# Patient Record
Sex: MALE | Race: WHITE
[De-identification: names, ages, dates, MRNs, and addresses within clinical notes are randomized per-mention and may not be internally consistent; named-entity substitution may affect disease eponyms.]

---

## 2019-08-25 ENCOUNTER — HOSPITAL ENCOUNTER (EMERGENCY)
Dept: HOSPITAL 96 - M.ERS | Age: 49
Discharge: HOME | End: 2019-08-25
Payer: COMMERCIAL

## 2019-08-25 VITALS — SYSTOLIC BLOOD PRESSURE: 127 MMHG | DIASTOLIC BLOOD PRESSURE: 91 MMHG

## 2019-08-25 VITALS — BODY MASS INDEX: 25.18 KG/M2 | HEIGHT: 69 IN | WEIGHT: 170 LBS

## 2019-08-25 DIAGNOSIS — F17.210: ICD-10-CM

## 2019-08-25 DIAGNOSIS — Z87.442: ICD-10-CM

## 2019-08-25 DIAGNOSIS — S60.551A: Primary | ICD-10-CM
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
## 2019-08-25 DIAGNOSIS — Z90.49: ICD-10-CM

## 2019-08-25 DIAGNOSIS — E78.00: ICD-10-CM
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
## 2019-08-25 DIAGNOSIS — Z85.51: ICD-10-CM

## 2020-01-22 ENCOUNTER — HOSPITAL ENCOUNTER (EMERGENCY)
Dept: HOSPITAL 96 - M.ERS | Age: 50
Discharge: HOME | End: 2020-01-22
Payer: COMMERCIAL

## 2020-01-22 VITALS — DIASTOLIC BLOOD PRESSURE: 80 MMHG | SYSTOLIC BLOOD PRESSURE: 118 MMHG

## 2020-01-22 VITALS — WEIGHT: 175 LBS | BODY MASS INDEX: 25.92 KG/M2 | HEIGHT: 69 IN

## 2020-01-22 DIAGNOSIS — R07.89: Primary | ICD-10-CM

## 2020-01-22 DIAGNOSIS — Z87.442: ICD-10-CM

## 2020-01-22 DIAGNOSIS — Z85.51: ICD-10-CM

## 2020-01-22 DIAGNOSIS — F17.210: ICD-10-CM

## 2020-01-22 DIAGNOSIS — E78.00: ICD-10-CM

## 2020-01-22 DIAGNOSIS — Z90.49: ICD-10-CM

## 2020-01-22 LAB
ABSOLUTE BASOPHILS: 0 THOU/UL (ref 0–0.2)
ABSOLUTE EOSINOPHILS: 0.1 THOU/UL (ref 0–0.7)
ABSOLUTE MONOCYTES: 0.9 THOU/UL (ref 0–1.2)
ALBUMIN SERPL-MCNC: 3.7 G/DL (ref 3.4–5)
ALP SERPL-CCNC: 91 U/L (ref 46–116)
ALT SERPL-CCNC: 47 U/L (ref 30–65)
ANION GAP SERPL CALC-SCNC: 6 MMOL/L (ref 7–16)
AST SERPL-CCNC: 25 U/L (ref 15–37)
BASOPHILS NFR BLD AUTO: 0.4 %
BILIRUB SERPL-MCNC: 0.2 MG/DL
BUN SERPL-MCNC: 9 MG/DL (ref 7–18)
CALCIUM SERPL-MCNC: 8.2 MG/DL (ref 8.5–10.1)
CHLORIDE SERPL-SCNC: 103 MMOL/L (ref 98–107)
CO2 SERPL-SCNC: 28 MMOL/L (ref 21–32)
CREAT SERPL-MCNC: 0.9 MG/DL (ref 0.6–1.3)
EOSINOPHIL NFR BLD: 1.4 %
GLUCOSE SERPL-MCNC: 97 MG/DL (ref 70–99)
GRANULOCYTES NFR BLD MANUAL: 61.8 %
HCT VFR BLD CALC: 42.6 % (ref 42–52)
HGB BLD-MCNC: 14.7 GM/DL (ref 14–18)
INR PPP: 1
LIPASE: 55 U/L (ref 73–393)
LYMPHOCYTES # BLD: 1.9 THOU/UL (ref 0.8–5.3)
LYMPHOCYTES NFR BLD AUTO: 25 %
MCH RBC QN AUTO: 31.6 PG (ref 26–34)
MCHC RBC AUTO-ENTMCNC: 34.6 G/DL (ref 28–37)
MCV RBC: 91.4 FL (ref 80–100)
MONOCYTES NFR BLD: 11.4 %
MPV: 8 FL. (ref 7.2–11.1)
NEUTROPHILS # BLD: 4.6 THOU/UL (ref 1.6–8.1)
NT-PRO BRAIN NAT PEPTIDE: 24 PG/ML (ref ?–300)
NUCLEATED RBCS: 0 /100WBC
PLATELET COUNT*: 237 THOU/UL (ref 150–400)
POTASSIUM SERPL-SCNC: 4.2 MMOL/L (ref 3.5–5.1)
PROT SERPL-MCNC: 7 G/DL (ref 6.4–8.2)
PROTHROMBIN TIME: 9.8 SECONDS (ref 9.2–11.5)
RBC # BLD AUTO: 4.66 MIL/UL (ref 4.5–6)
RDW-CV: 14.3 % (ref 10.5–14.5)
SODIUM SERPL-SCNC: 137 MMOL/L (ref 136–145)
WBC # BLD AUTO: 7.5 THOU/UL (ref 4–11)

## 2020-01-22 NOTE — EKG
[place] [address]
Phone:  [phone]                     ELECTROCARDIOGRAM REPORT      
_______________________________________________________________________________
 
Name:       LINDA REY                  Room:                      [facility]#:  [id number]      Account #:      [id number]  
Admission:  20     Attend Phys:                         
Discharge:  20     Date of Birth:  [birth year]  
         Report #: [id number]
    [id number]
_______________________________________________________________________________
THIS REPORT FOR:  //name//                      
 
                         [facility] ED
                                       
Test Date:    2020               Test Time:    10:26:28
Pat Name:     LINDA REY              Department:   
Patient ID:   [id number]            Room:          
Gender:       M                        Technician:   
:          1970               Requested By: Yash Aggarwal
Order Number: [id number] MD:   Luis Alberto Bernstein
                                 Measurements
Intervals                              Axis          
Rate:         86                       P:            69
AL:           147                      QRS:          14
QRSD:         91                       T:            48
QT:           354                                    
QTc:          424                                    
                           Interpretive Statements
Sinus rhythm
 
Compared to ECG 2015 09:17:50
no change
 
Electronically Signed On 2020 15:15:18 CST by Luis Alberto Bernstein
[email or web address]
 
 
 
 
 
 
 
 
 
 
 
 
 
 
 
 
 
 
  <ELECTRONICALLY SIGNED>
                                           By: Luis Alberto Bernstein MD, [facility]      
  20     1515
D: 20 1026   _____________________________________
T: 20 1026   Luis Alberto Bernstein MD, FACC        /EPI

## 2020-08-23 ENCOUNTER — HOSPITAL ENCOUNTER (EMERGENCY)
Dept: HOSPITAL 96 - M.ERS | Age: 50
Discharge: HOME | End: 2020-08-23
Payer: COMMERCIAL

## 2020-08-23 VITALS — DIASTOLIC BLOOD PRESSURE: 82 MMHG | SYSTOLIC BLOOD PRESSURE: 121 MMHG

## 2020-08-23 VITALS — BODY MASS INDEX: 24.88 KG/M2 | HEIGHT: 69 IN | WEIGHT: 167.99 LBS

## 2020-08-23 DIAGNOSIS — E78.00: ICD-10-CM

## 2020-08-23 DIAGNOSIS — M25.461: ICD-10-CM

## 2020-08-23 DIAGNOSIS — Z90.49: ICD-10-CM

## 2020-08-23 DIAGNOSIS — Z87.442: ICD-10-CM

## 2020-08-23 DIAGNOSIS — Z85.51: ICD-10-CM

## 2020-08-23 DIAGNOSIS — F17.210: ICD-10-CM

## 2020-08-23 DIAGNOSIS — M70.51: Primary | ICD-10-CM
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
## 2021-01-14 ENCOUNTER — HOSPITAL ENCOUNTER (EMERGENCY)
Dept: HOSPITAL 96 - M.ERS | Age: 51
Discharge: HOME | End: 2021-01-14
Payer: COMMERCIAL

## 2021-01-14 VITALS — WEIGHT: 172 LBS | BODY MASS INDEX: 24.62 KG/M2 | HEIGHT: 70 IN

## 2021-01-14 VITALS — DIASTOLIC BLOOD PRESSURE: 97 MMHG | SYSTOLIC BLOOD PRESSURE: 139 MMHG

## 2021-01-14 DIAGNOSIS — E78.5: ICD-10-CM

## 2021-01-14 DIAGNOSIS — F17.210: ICD-10-CM

## 2021-01-14 DIAGNOSIS — K08.89: Primary | ICD-10-CM

## 2021-04-18 ENCOUNTER — HOSPITAL ENCOUNTER (EMERGENCY)
Dept: HOSPITAL 96 - M.ERS | Age: 51
Discharge: HOME | End: 2021-04-18
Payer: COMMERCIAL

## 2021-04-18 VITALS — WEIGHT: 181.4 LBS | BODY MASS INDEX: 26.87 KG/M2 | HEIGHT: 69 IN

## 2021-04-18 VITALS — DIASTOLIC BLOOD PRESSURE: 80 MMHG | SYSTOLIC BLOOD PRESSURE: 121 MMHG

## 2021-04-18 DIAGNOSIS — E78.00: ICD-10-CM

## 2021-04-18 DIAGNOSIS — F17.210: ICD-10-CM

## 2021-04-18 DIAGNOSIS — Z87.442: ICD-10-CM

## 2021-04-18 DIAGNOSIS — R07.89: Primary | ICD-10-CM

## 2021-04-18 DIAGNOSIS — Z90.49: ICD-10-CM

## 2021-04-18 DIAGNOSIS — Z85.51: ICD-10-CM

## 2021-04-18 LAB
ALBUMIN SERPL-MCNC: 3.5 G/DL (ref 3.4–5)
ALP SERPL-CCNC: 91 U/L (ref 46–116)
ALT SERPL-CCNC: 27 U/L (ref 30–65)
ANION GAP SERPL CALC-SCNC: 10 MMOL/L (ref 7–16)
AST SERPL-CCNC: 13 U/L (ref 15–37)
BILIRUB SERPL-MCNC: 0.2 MG/DL
BUN SERPL-MCNC: 13 MG/DL (ref 7–18)
CALCIUM SERPL-MCNC: 8.3 MG/DL (ref 8.5–10.1)
CHLORIDE SERPL-SCNC: 105 MMOL/L (ref 98–107)
CO2 SERPL-SCNC: 25 MMOL/L (ref 21–32)
CREAT SERPL-MCNC: 0.8 MG/DL (ref 0.6–1.3)
GLUCOSE SERPL-MCNC: 119 MG/DL (ref 70–99)
HCT VFR BLD CALC: 43.3 % (ref 42–52)
HGB BLD-MCNC: 14.3 GM/DL (ref 14–18)
MCH RBC QN AUTO: 30.4 PG (ref 26–34)
MCHC RBC AUTO-ENTMCNC: 33.1 G/DL (ref 28–37)
MCV RBC: 91.8 FL (ref 80–100)
MPV: 7.5 FL. (ref 7.2–11.1)
PLATELET COUNT*: 229 THOU/UL (ref 150–400)
POTASSIUM SERPL-SCNC: 4.1 MMOL/L (ref 3.5–5.1)
PROT SERPL-MCNC: 6.9 G/DL (ref 6.4–8.2)
RBC # BLD AUTO: 4.72 MIL/UL (ref 4.5–6)
RDW-CV: 14.6 % (ref 10.5–14.5)
SODIUM SERPL-SCNC: 140 MMOL/L (ref 136–145)
WBC # BLD AUTO: 6.8 THOU/UL (ref 4–11)

## 2022-12-31 NOTE — EKG
[place] [address]
Phone:  [phone]                     ELECTROCARDIOGRAM REPORT      
_______________________________________________________________________________
 
Name:         CANDIDOLINDA S                 Room:                     [facility]#:    [id number]     Account #:     [id number]  
Admission:    21    Attend Phys:                     
Discharge:    21    Date of Birth: [birth year]  
Date of Service: 21 1704  Report #:      [id number]
        [id number]
_______________________________________________________________________________
THIS REPORT FOR:  //name//                      
 
                         [facility] ED
                                       
Test Date:    2021               Test Time:    17:04:44
Pat Name:     LINDA REY              Department:   
Patient ID:   [id number]            Room:          
Gender:                               Technician:   
:          1970               Requested By: Mono Gallegos
Order Number: [id number] MD:   Tobias Armstrong
                                 Measurements
Intervals                              Axis          
Rate:         86                       P:            59
FL:           151                      QRS:          23
QRSD:         91                       T:            37
QT:           342                                    
QTc:          409                                    
                           Interpretive Statements
Sinus rhythm
Compared to ECG 2020 10:26:28
No significant changes
Electronically Signed On 2021 14:44:18 CDT by Tobias Armstrong
[email or web address]
 
 
 
 
 
 
 
 
 
 
 
 
 
 
 
 
 
 
 
 
 
  <ELECTRONICALLY SIGNED>
                                           By: Tobias Armstrong MD, [facility]     
  21     1444
D: 21 1704   _____________________________________
T: 21 170   Tobias Armstrong MD, FAC       /EPI
3